# Patient Record
Sex: MALE | Race: WHITE | NOT HISPANIC OR LATINO | Employment: FULL TIME | URBAN - METROPOLITAN AREA
[De-identification: names, ages, dates, MRNs, and addresses within clinical notes are randomized per-mention and may not be internally consistent; named-entity substitution may affect disease eponyms.]

---

## 2022-06-14 ENCOUNTER — NURSE TRIAGE (OUTPATIENT)
Dept: NURSING | Facility: CLINIC | Age: 57
End: 2022-06-14
Payer: COMMERCIAL

## 2022-06-14 ENCOUNTER — OFFICE VISIT (OUTPATIENT)
Dept: URGENT CARE | Facility: URGENT CARE | Age: 57
End: 2022-06-14
Payer: COMMERCIAL

## 2022-06-14 VITALS
DIASTOLIC BLOOD PRESSURE: 88 MMHG | SYSTOLIC BLOOD PRESSURE: 137 MMHG | TEMPERATURE: 96.6 F | BODY MASS INDEX: 33.8 KG/M2 | HEART RATE: 69 BPM | RESPIRATION RATE: 18 BRPM | OXYGEN SATURATION: 97 % | WEIGHT: 211 LBS

## 2022-06-14 DIAGNOSIS — A69.20 ERYTHEMA MIGRANS (LYME DISEASE): Primary | ICD-10-CM

## 2022-06-14 PROCEDURE — 99203 OFFICE O/P NEW LOW 30 MIN: CPT | Performed by: NURSE PRACTITIONER

## 2022-06-14 RX ORDER — DOXYCYCLINE 100 MG/1
100 CAPSULE ORAL 2 TIMES DAILY
Qty: 28 CAPSULE | Refills: 0 | Status: SHIPPED | OUTPATIENT
Start: 2022-06-14 | End: 2022-06-28

## 2022-06-14 NOTE — PROGRESS NOTES
"Chief Complaint   Patient presents with     Tick Bite     Possible lyme's - the tick was taken off last night on his arm and there was a rash on the back of his right leg behind the knee     SUBJECTIVE:  Mika Deleon is a 57 year old male who presents to the clinic today with a rash on his posterior right thigh and tick bite noted yesterday. He pulled the tick off his right upper arm. No fevers, flu like illness, headache, neck stiffness, joint pains. The rash is the 2x2\" center red adelita, white ring, with a red ring around it.    Past Medical History:   Diagnosis Date     MURMUR      cyclobenzaprine (FLEXERIL) 10 MG tablet, Take 1 tablet (10 mg) by mouth 3 times daily as needed for muscle spasms (Patient not taking: Reported on 6/14/2022)  methylprednisoLONE (MEDROL DOSEPAK) 4 MG tablet, Follow package instructions (Patient not taking: Reported on 6/14/2022)    No current facility-administered medications on file prior to visit.    Social History     Tobacco Use     Smoking status: Never Smoker     Smokeless tobacco: Never Used   Substance Use Topics     Alcohol use: Yes     Comment: 3/4 bottle of shania per week     Allergies   Allergen Reactions     Tomato      Amoxicillin-Pot Clavulanate Rash     Review of Systems   All systems negative except for those listed above in HPI.    EXAM:   /88 (BP Location: Left arm, Patient Position: Sitting, Cuff Size: Adult Large)   Pulse 69   Temp (!) 96.6  F (35.9  C) (Temporal)   Resp 18   Wt 95.7 kg (211 lb)   SpO2 97%   BMI 33.80 kg/m      Physical Exam  Vitals reviewed.   Constitutional:       General: He is not in acute distress.     Appearance: Normal appearance. He is not ill-appearing, toxic-appearing or diaphoretic.   HENT:      Head: Normocephalic and atraumatic.      Nose: Nose normal.      Mouth/Throat:      Mouth: Mucous membranes are moist.      Pharynx: Oropharynx is clear.   Eyes:      Extraocular Movements: Extraocular movements intact.      " "Conjunctiva/sclera: Conjunctivae normal.      Pupils: Pupils are equal, round, and reactive to light.   Cardiovascular:      Rate and Rhythm: Normal rate.   Pulmonary:      Effort: Pulmonary effort is normal.   Musculoskeletal:         General: Normal range of motion.      Cervical back: Normal range of motion and neck supple.   Skin:     General: Skin is warm and dry.      Findings: Erythema and rash present.      Comments: 2x2\" center red adelita, white ring, with a red ring around it on right posterior thigh.   Neurological:      General: No focal deficit present.      Mental Status: He is alert and oriented to person, place, and time.   Psychiatric:         Mood and Affect: Mood normal.         Behavior: Behavior normal.       ASSESSMENT:    ICD-10-CM    1. Erythema migrans (Lyme disease)  A69.20 doxycycline hyclate (VIBRAMYCIN) 100 MG capsule     PLAN:    Treating empirically for lyme erythema migrans given rash    A tick must be attached for at least 24-48 hours to transfer pathogens and cause an infection. Infection is very unlikely in the first 48-72 hours.  Infection is more likely if the tick is engorged or has been attached for over 72 hours.  Prophylactic antibiotics are recommended only if:  The tick was attached for 36 hours or more AND less than 72 hours has passed since the tick was removed  Treatment with a single dose of Doxycycline, 200mg (4mg/kg in children 8 years of age or older) by mouth with food.  -Do not give to children under 8 or pregnant or lactating women  If there is a contraindication to doxycycline, an alternate antibiotic is not recommended.  Guidelines state, \"If sections of the mouthparts of the tick remain in the skin, they should be left alone as they will normally be expelled spontaneously.\"  Ticks should be removed with tweezers or protected fingers pulling straight out gently and firmly using steady pressure.   Apply ice packs, may take benadryl as needed " itch/irritation.  Ibuprofen for discomfort.   Watch for signs of secondary infection- redness, swelling, pain, colored discharge or fever.  Advised to watch for erythema migrans rash (circular red ringed rash), fever, chills, sweats, body aches, stiff neck, headache, joint pain/ discomfort/ sweling or other flu/illness symptoms and be seen by primary care provider at that time.   Erythema migrans rash appears several days after tick bite and is separate from a local reaction to a tick bite.  Please follow up with primary care provider if not improving, worsening or new symptoms.  It will take 6-8 weeks before antibodies are detected with the lyme screen titer.    ETELVINA [facility]

## 2022-06-14 NOTE — TELEPHONE ENCOUNTER
"Nurse Triage SBAR    Is this a 2nd Level Triage? NO    Situation: Patient calling with tick bite.  Consent: not needed    Background: Pt states he had a tick on his arm last night.  He was embedded and pt pulled tick out.  Pt checked the rest of his body and found a big red blotch the size of a half dollar.  Not a bullseye but a big red dot.  R leg.    Right in the center you can \"see a little nub so that's where I must have been bitten\".     Assessment:   * Pt found tick on arm.  Pt was hiking by [place] and feels he had likely gotten the tick there.  Pt states it was likely on for just hours before he found it.   Feels he was able to remove head.    * After this pt states he had a red [place] on his right leg.  About half dollar size and you can \"see a little nub in the middle so that's where I must have been bitten\".    * Pt states he is unsure of last tetanus booster      Protocol Recommended Disposition:   Seen in office today.     Recommendation: Advised patient to Pt chose to go to [facility] to be seen.  . Reviewed concerning symptoms and when to call back.     Sera Hilton RN [facility] 6/14/2022 10:15 AM      COVID 19 Nurse Triage Plan/Patient Instructions    Please be aware that novel coronavirus (COVID-19) may be circulating in the community. If you develop symptoms such as fever, cough, or SOB or if you have concerns about the presence of another infection including coronavirus (COVID-19), please contact your health care provider or visit [email or web address].     Disposition/Instructions    In-Person Visit with provider recommended. Reference Visit Selection Guide.    Thank you for taking steps to prevent the spread of this virus.  o Limit your contact with others.  o Wear a simple mask to cover your cough.  o Wash your hands well and often.    Resources    [facility]: About COVID-19: [email or web address]    [facility]: What to Do If You're " Sick: www.cdc.gov/coronavirus/2019-ncov/about/steps-when-sick.html    CDC: Ending Home Isolation: www.cdc.gov/coronavirus/2019-ncov/hcp/disposition-in-home-patients.html     CDC: Caring for Someone: www.cdc.gov/coronavirus/2019-ncov/if-you-are-sick/care-for-someone.html     [facility]: Interim Guidance for Hospital Discharge to Home: [email or web address]/hcp/hospdischarge.[email or web address]    [facility] clinical trials (COVID-19 research studies): [email or web address].[facility]-clinical-trials     Below are the COVID-19 hotlines at the [place] [facility] ([facility]). Interpreters are available.   o For health questions: Call 312-800-0760 or 1-873.707.6594 (7 a.m. to 7 p.m.)  o For questions about schools and childcare: Call 031-656-3944 or 1-761.743.7127 (7 a.m. to 7 p.m.)                         Reason for Disposition    Red ring or bull's-eye rash occurs around a deer tick bite    Additional Information    Negative: Not a tick bite    Negative: Patient sounds very sick or weak to the triager    Negative: Fever or severe headache occurs, 2 to 14 days following the bite    Negative: Widespread rash occurs, 2 to 14 days following the bite    Negative: Can't remove live tick (after using Care Advice)    Negative: Can't remove tick's head that was broken off in the skin (after using Care Advice)    Negative: Fever and area is red    Negative: Fever and area is very tender to touch    Negative: Red streak or red line and length > 2 inches (5 cm)    Protocols used: TICK BITE-A-[place]

## 2022-06-14 NOTE — PATIENT INSTRUCTIONS
"Treating empirically for lyme erythema migrans given rash    A tick must be attached for at least 24-48 hours to transfer pathogens and cause an infection. Infection is very unlikely in the first 48-72 hours.  Infection is more likely if the tick is engorged or has been attached for over 72 hours.  Prophylactic antibiotics are recommended only if:  The tick was attached for 36 hours or more AND less than 72 hours has passed since the tick was removed  Treatment with a single dose of Doxycycline, 200mg (4mg/kg in children 8 years of age or older) by mouth with food.  -Do not give to children under 8 or pregnant or lactating women  If there is a contraindication to doxycycline, an alternate antibiotic is not recommended.  Guidelines state, \"If sections of the mouthparts of the tick remain in the skin, they should be left alone as they will normally be expelled spontaneously.\"  Ticks should be removed with tweezers or protected fingers pulling straight out gently and firmly using steady pressure.   Apply ice packs, may take benadryl as needed itch/irritation.  Ibuprofen for discomfort.   Watch for signs of secondary infection- redness, swelling, pain, colored discharge or fever.  Advised to watch for erythema migrans rash (circular red ringed rash), fever, chills, sweats, body aches, stiff neck, headache, joint pain/ discomfort/ sweling or other flu/illness symptoms and be seen by primary care provider at that time.   Erythema migrans rash appears several days after tick bite and is separate from a local reaction to a tick bite.  Please follow up with primary care provider if not improving, worsening or new symptoms.  It will take 6-8 weeks before antibodies are detected with the lyme screen titer.  "